# Patient Record
Sex: FEMALE | Race: BLACK OR AFRICAN AMERICAN
[De-identification: names, ages, dates, MRNs, and addresses within clinical notes are randomized per-mention and may not be internally consistent; named-entity substitution may affect disease eponyms.]

---

## 2019-06-03 ENCOUNTER — HOSPITAL ENCOUNTER (INPATIENT)
Dept: HOSPITAL 74 - YASAS | Age: 54
LOS: 4 days | Discharge: OTHER INSTITUTION | DRG: 773 | End: 2019-06-07
Attending: SURGERY | Admitting: SURGERY
Payer: COMMERCIAL

## 2019-06-03 VITALS — BODY MASS INDEX: 24.9 KG/M2

## 2019-06-03 DIAGNOSIS — F11.20: ICD-10-CM

## 2019-06-03 DIAGNOSIS — F10.230: Primary | ICD-10-CM

## 2019-06-03 DIAGNOSIS — H26.9: ICD-10-CM

## 2019-06-03 DIAGNOSIS — F12.20: ICD-10-CM

## 2019-06-03 DIAGNOSIS — F13.230: ICD-10-CM

## 2019-06-03 DIAGNOSIS — R94.31: ICD-10-CM

## 2019-06-03 DIAGNOSIS — Z87.09: ICD-10-CM

## 2019-06-03 DIAGNOSIS — F14.20: ICD-10-CM

## 2019-06-03 PROCEDURE — HZ2ZZZZ DETOXIFICATION SERVICES FOR SUBSTANCE ABUSE TREATMENT: ICD-10-PCS | Performed by: SURGERY

## 2019-06-03 RX ADMIN — Medication SCH MG: at 22:17

## 2019-06-03 RX ADMIN — Medication PRN MG: at 22:17

## 2019-06-03 NOTE — HP
CIWA Score


Nausea/Vomitin-No Nausea/No Vomiting


Muscle Tremors: 4-Moderate,w/Arms Extend


Anxiety: 3


Agitation: 3


Paroxysmal Sweats: 2 (Slight facial mositure)


Orientation: 0-Oriented


Tacttile Disturbances: 0-None


Auditory Disturbances: 0-None


Visual Disturbances: 0-None


Headache: 0-None Present


CIWA-Ar Total Score: 12





- Admission Criteria


[facility]AS Guidelines: Admission for Medically Managed Detox: 


Requires at least one of the followin. CIWA greater than 12


2. Seizures within the past 24 hours


3. Delirium tremens within the past 24 hours


4. Hallucinations within the past 24 hours


5. Acute intervention needed for co  occurring medical disorder


6. Acute intervention needed for co  occurring psychiatric disorder


7. Severe withdrawal that cannot be handled at a lower level of care (continued


    vomiting, continued diarrhea, abnormal vital signs) requiring intravenous


    medication and/or fluids


8. Pregnancy





Patient presents the following: CIWA greater than 12


Admission Criteria Met: Admission criteria met





Admission ROS [facility]


Chief Complaint: 





Alcohol withdrawal.


Allergies/Adverse Reactions: 


 Allergies











Allergy/AdvReac Type Severity Reaction Status Date / Time


 


No Known Drug Allergies Allergy   Verified 19 17:56


 


rice Allergy   Verified 19 13:30











History of Present Illness: 





Alcohol use began at age 13. Current use x 5 years is approx 3 -16 oz vodkas 

daily


Cocaine - 1 bundle started use at about 15 years ago (Smokes and injects)


Marijuana use began at age 15. 


Benzo use began at age 54.  1-2 klonopin 2x/wk 


Heroin use began at age 17.  Relapses with heroin on the weekends. Uses IVDU. 

Does not share needles or works. On [facility]. Current methadone 

dose is 80 mg daily.  Does not have a Narcan kit at home. 





Denies hx seizures or overdoses.


Last blackout 1 month ago. 





PMHx: Asthma (last exacerbation years ago)





MHHx: Depression, insomnia,  and anxiety. Last saw a MH Provider 1 month ago. 

States compliant w/ mental health medications. Denies thoughts of harming self 

or others.

















Patient Name: Priya Erickson YOB: 1965


 


Address: [address] [place] [address] Sex: Female














 Rx Written Rx Dispensed Drug Quantity Days Supply Prescriber Name  


 


2019 zolpidem tartrate 5 mg tablet  30 30 Adepoju, Patricia 

Tiana  


 


2019 zolpidem tartrate 10 mg tablet  30 30 Adepoju, Patricia 

Tiana  


 


2019 zolpidem tartrate 10 mg tablet  30 30 Adepoju, Patricia 

Tiana  


 


2019 zolpidem tartrate 10 mg tablet  30 30 Adepoju, Patricia 

Tiana  


 


12/10/2018 2018 zolpidem tartrate 10 mg tablet  30 30 Adepoju, Patricia 

Tiana  


 


10/30/2018 2018 zolpidem tartrate 10 mg tablet  30 30 Adepoju, Patricia 

Tiana  


 


10/02/2018 10/08/2018 zolpidem tartrate 10 mg tablet  30 30 Adepoju, Patricia 

Tiana  


 


2018 zolpidem tartrate 10 mg tablet  30 30 Adepoju, Patricia 

Tiana  


 


2018 zolpidem tartrate 10 mg tablet  30 30 Adepoju, Patricia 

Tiana  


 


2018 07/10/2018 zolpidem tartrate 10 mg tablet  30 30 Adepoju, Patricia 

Tiana  


 


2018 zolpidem tartrate 10 mg tablet  30 30 Adepoju, Patricia Montiel  








Search Terms: Priya Dre, 1965 


Search Date: 2019 05:05:57 PM 


States Searched: [place], [place], [place], [place], [place], [place], [place], [place] 


The Drug Utilization Report below displays the controlled substance 

prescriptions, if any, that were dispensed in the indicated state(s). The 

information displayed on this report is compiled from requests submitted to 

other states' PMPs, and accurately reflects the information as returned by 

them. Blank fields indicate data not provided by other state.


This report was requested by: Olga Yu | Reference #: [id number] 


Exam Limitations: No Limitations





- Ebola screening


Have you traveled outside of the country in the last 21 days: No


Have you had contact with anyone from an Ebola affected area: No


Have you been sick,other than usual withdrawal symptoms: No (Denies recent 

exposure to measles)


Do you have a fever: No





- Review of Systems


Constitutional: Chills, Diaphoresis, Changes in sleep (Difficulty staying asleep

)


EENT: reports: Cataracts ((R) eye -  no eye drops), Dental Problems (Dentures. 

Able to chew and swallow ok)


Respiratory: reports: No Symptoms reported


Cardiac: reports: No Symptoms Reported


GI: reports: No Symptoms Reported


: reports: No Symptoms Reported


Musculoskeletal: reports: No Symptoms Reported


Neuro: reports: No Symptoms reported


Endocrine: reports: No Symptoms Reported


Hematology: reports: Anemia (Low iron)


Psychiatric: reports: Judgement Intact, Agitated, Anxious, Depressed (Denies 

thoughts of harming self or others.)


Other Systems: Reviewed and Negative





Patient History





- PPD History


Previous Implant?: Yes


Documented Results: Negative w/o proof


Implanted On Prior R Admission?: No


PPD to be Administered?: Yes





- Reproductive History


Patient is a Female of Child Bearing Age (11 -55 yrs old): Yes


Patient Pregnant: No (LMP )





- Smoking Cessation


Smoking history: Current every day smoker


Have you smoked in the past 12 months: Yes


Aproximately how many cigarettes per day: 15


Hx Chewing Tobacco Use: No


Initiated information on smoking cessation: Yes


'Breaking Loose' booklet given: 19





- Substance & Tx. History


Hx Alcohol Use: Yes


Hx Substance Use: Yes


Substance Use Type: Alcohol, Cocaine, Marijuana, Tranquilizers





- Substances abused


  ** Alcohol


Substance route: Oral


Frequency: Daily


Amount used: 1 PINT VODKA


Age of first use: 13


Date of last use: 19





  ** Other


Other (specify): METHADONE


Substance route: Oral


Frequency: Daily (Prescribed)


Amount used: 80MG


Age of first use: 42


Date of last use: 19





Admission Physical Exam [facility]





- Vital Signs


Vital Signs: 


 Vital Signs - 24 hr











  19





  13:30


 


Temperature 98.4 F


 


Pulse Rate 86


 


Respiratory 18





Rate 


 


Blood Pressure 130/72














- Physical


General Appearance: Yes: Nourished, Mild Distress, Tremorous, Sweating (Slight 

facial mositure), Anxious


HEENTM: Yes: EOMI, Hearing grossly Normal, Normocephalic, Pharynx Normal (No 

teeth), Other ((R) pupil whitish/opaque in color. (L) black. Both pupils 

respond to light.)


Respiratory: Yes: Lungs Clear, Normal Breath Sounds, No Respiratory Distress


Neck: Yes: No masses,lesions,Nodules, Supple


Breast: Yes: Breast Exam Deferred


Cardiology: Yes: Regular Rhythm, Regular Rate (HR: 64.), S1, S2, Bradycardia, 

Other (Abnormal EKG (? septal infarct/T wave abnormality) this visit.)


Abdominal: Yes: Non Tender, Flat, Soft, Increased Bowel Sounds


Genitourinary: Yes: Within Normal Limits


Back: Yes: Normal Inspection


Musculoskeletal: Yes: full range of Motion, Gait Steady


Extremities: Yes: Normal Capillary Refill, Normal Range of Motion, Non-Tender, 

Tremors (Tremors of hands when arms elevated)


Neurological: Yes: CNs II-XII NML intact, Fully Oriented, Alert, Motor Strength 

5/5, Normal Mood/Affect


Integumentary: Yes: Normal Color, Warm, Track Marks (Few needle marks. No 

increased erythema or warmth)


Lymphatic: Yes: Within Normal Limits





- Diagnostic


(1) Alcohol dependence with uncomplicated withdrawal


Current Visit: Yes   Status: Acute   





(2) Cannabis dependence, uncomplicated


Current Visit: Yes   Status: Chronic   





(3) Sedative, hypnotic or anxiolytic abuse, uncomplicated


Current Visit: Yes   Status: Acute   





(4) Cocaine dependence, uncomplicated


Current Visit: Yes   Status: Chronic   





(5) Opioid dependence on agonist therapy


Current Visit: Yes   Status: Chronic   Comment: On [facility]. 

Continues to relapse w/ opiates.   





(6) History of asthma


Current Visit: No   Status: Chronic   





(7) Pupil disorder


Current Visit: Yes   Status: Chronic   Comment: Opaque (R) pupil   





(8) Abnormal finding on EKG


Current Visit: Yes   Status: Suspected   Comment: Denies CP/SOB. Will repeat 

EKG in am.    





Cleared for Admission [facility]





- Detox or Rehab


[facility] Level of Care: Medically Managed


Detox Regimen/Protocol: Librium


Claeared for Rehab Admission: No





Inpatient Rehab Admission





- Rehab Decision to Admit


Inpatient rehab admission?: No

## 2019-06-04 LAB
ALBUMIN SERPL-MCNC: 3.2 G/DL (ref 3.4–5)
ALP SERPL-CCNC: 95 U/L (ref 45–117)
ALT SERPL-CCNC: 31 U/L (ref 13–61)
ANION GAP SERPL CALC-SCNC: 2 MMOL/L (ref 8–16)
APPEARANCE UR: (no result)
AST SERPL-CCNC: 29 U/L (ref 15–37)
BACTERIA #/AREA URNS HPF: 360.9 /HPF
BILIRUB SERPL-MCNC: 0.2 MG/DL (ref 0.2–1)
BILIRUB UR STRIP.AUTO-MCNC: NEGATIVE MG/DL
BUN SERPL-MCNC: 13 MG/DL (ref 7–18)
CALCIUM SERPL-MCNC: 9.3 MG/DL (ref 8.5–10.1)
CASTS #/AREA URNS LPF: 15 /LPF (ref 0–8)
CHLORIDE SERPL-SCNC: 108 MMOL/L (ref 98–107)
CO2 SERPL-SCNC: 35 MMOL/L (ref 21–32)
COLOR UR: YELLOW
CREAT SERPL-MCNC: 1 MG/DL (ref 0.55–1.3)
DEPRECATED RDW RBC AUTO: 14.2 % (ref 11.6–15.6)
EPITH CASTS URNS QL MICRO: 8.5 /HPF
GLUCOSE SERPL-MCNC: 71 MG/DL (ref 74–106)
HCT VFR BLD CALC: 42.3 % (ref 32.4–45.2)
HGB BLD-MCNC: 13.5 GM/DL (ref 10.7–15.3)
KETONES UR QL STRIP: (no result)
LEUKOCYTE ESTERASE UR QL STRIP.AUTO: (no result)
MCH RBC QN AUTO: 30.3 PG (ref 25.7–33.7)
MCHC RBC AUTO-ENTMCNC: 32 G/DL (ref 32–36)
MCV RBC: 94.5 FL (ref 80–96)
NITRITE UR QL STRIP: NEGATIVE
PH UR: 6 [PH] (ref 5–8)
PLATELET # BLD AUTO: 154 K/MM3 (ref 134–434)
PMV BLD: 9.8 FL (ref 7.5–11.1)
POTASSIUM SERPLBLD-SCNC: 4 MMOL/L (ref 3.5–5.1)
PROT SERPL-MCNC: 6.5 G/DL (ref 6.4–8.2)
PROT UR QL STRIP: NEGATIVE
PROT UR QL STRIP: NEGATIVE
RBC # BLD AUTO: 3 /HPF (ref 0–4)
RBC # BLD AUTO: 4.48 M/MM3 (ref 3.6–5.2)
SODIUM SERPL-SCNC: 144 MMOL/L (ref 136–145)
SP GR UR: 1.02 (ref 1.01–1.03)
UROBILINOGEN UR STRIP-MCNC: 1 MG/DL (ref 0.2–1)
WBC # BLD AUTO: 5.1 K/MM3 (ref 4–10)
WBC # UR AUTO: 15 /HPF (ref 0–5)

## 2019-06-04 RX ADMIN — Medication SCH MG: at 21:55

## 2019-06-04 RX ADMIN — Medication PRN MG: at 21:55

## 2019-06-04 RX ADMIN — Medication SCH TAB: at 09:31

## 2019-06-04 RX ADMIN — NICOTINE SCH: 21 PATCH TRANSDERMAL at 11:00

## 2019-06-04 NOTE — EKG
Test Reason : 

Blood Pressure : ***/*** mmHG

Vent. Rate : 060 BPM     Atrial Rate : 060 BPM

   P-R Int : 128 ms          QRS Dur : 082 ms

    QT Int : 444 ms       P-R-T Axes : 053 074 060 degrees

   QTc Int : 444 ms

 

NORMAL SINUS RHYTHM

MODERATE VOLTAGE CRITERIA FOR LVH, MAY BE NORMAL VARIANT

CANNOT RULE OUT SEPTAL INFARCT , AGE UNDETERMINED

T WAVE ABNORMALITY, CONSIDER ANTERIOR ISCHEMIA

ABNORMAL ECG

NO PREVIOUS ECGS AVAILABLE

Confirmed by Filemon Burton MD ([id number]) on 6/4/2019 12:04:28 PM

 

Referred By:             Confirmed By:Filemon Burton MD

## 2019-06-04 NOTE — EKG
Test Reason : 

Blood Pressure : ***/*** mmHG

Vent. Rate : 063 BPM     Atrial Rate : 063 BPM

   P-R Int : 136 ms          QRS Dur : 074 ms

    QT Int : 442 ms       P-R-T Axes : 061 058 041 degrees

   QTc Int : 452 ms

 

NORMAL SINUS RHYTHM

NONSPECIFIC T WAVE ABNORMALITY

ABNORMAL ECG

WHEN COMPARED WITH ECG OF 03-JUN-2019 18:54,

NO SIGNIFICANT CHANGE WAS FOUND

Confirmed by Filemon Burton MD ([id number]) on 6/4/2019 12:04:24 PM

 

Referred By: FEDE YOUNG           Confirmed By:Filemon Burton MD

## 2019-06-05 LAB — CAOX CRY #/AREA URNS HPF: (no result) /HPF

## 2019-06-05 RX ADMIN — Medication SCH TAB: at 10:57

## 2019-06-05 RX ADMIN — Medication SCH MG: at 22:37

## 2019-06-05 RX ADMIN — Medication PRN MG: at 22:38

## 2019-06-05 RX ADMIN — NICOTINE SCH: 21 PATCH TRANSDERMAL at 10:57

## 2019-06-05 RX ADMIN — METHADONE HYDROCHLORIDE SCH MG: 40 TABLET ORAL at 05:34

## 2019-06-05 NOTE — PN
[facility] CIWA





- CIWA Score


Nausea/Vomitin-No Nausea/No Vomiting


Muscle Tremors: 3


Anxiety: 2


Agitation: 2


Paroxysmal Sweats: 2


Orientation: 0-Oriented


Tacttile Disturbances: 0-None


Auditory Disturbances: 0-None


Visual Disturbances: 0-None


Headache: 0-None Present


CIWA-Ar Total Score: 9





[facility] Progress Note (SOAP)


Subjective: 





feeling better


little sweats


chills





Objective: 





19 10:29


 Vital Signs











Temperature  97.7 F   19 09:35


 


Pulse Rate  53 L  19 09:35


 


Respiratory Rate  18   19 09:35


 


Blood Pressure  103/70   19 09:35


 


O2 Sat by Pulse Oximetry (%)      








 Laboratory Tests











  19





  18:31 07:00 07:00


 


WBC   5.1 


 


RBC   4.48 


 


Hgb   13.5 


 


Hct   42.3 


 


MCV   94.5 


 


MCH   30.3 


 


MCHC   32.0 


 


RDW   14.2 


 


Plt Count   154 


 


MPV   9.8 


 


Sodium    144


 


Potassium    4.0


 


Chloride    108 H


 


Carbon Dioxide    35 H


 


Anion Gap    2 L


 


BUN    13


 


Creatinine    1.0


 


Est GFR (CKD-EPI)AfAm    73.97


 


Est GFR (CKD-EPI)NonAf    63.82


 


Random Glucose    71 L


 


Calcium    9.3


 


Total Bilirubin    0.2


 


AST    29


 


ALT    31


 


Alkaline Phosphatase    95


 


Total Protein    6.5


 


Albumin    3.2 L


 


Urine Color   


 


Urine Appearance   


 


Urine pH   


 


Ur Specific Gravity   


 


Urine Protein   


 


Urine Glucose (UA)   


 


Urine Ketones   


 


Urine Blood   


 


Urine Nitrite   


 


Urine Bilirubin   


 


Urine Urobilinogen   


 


Ur Leukocyte Esterase   


 


Urine WBC (Auto)   


 


Urine RBC (Auto)   


 


Urine Casts (Auto)   


 


U Epithel Cells (Auto)   


 


U Sm Round Cell (Auto)   


 


Urine Crystals (Auto)   


 


Urine Bacteria (Auto)   


 


POC Urine HCG, Qual  Negative  


 


RPR Titer   














  19





  07:00 11:20


 


WBC  


 


RBC  


 


Hgb  


 


Hct  


 


MCV  


 


MCH  


 


MCHC  


 


RDW  


 


Plt Count  


 


MPV  


 


Sodium  


 


Potassium  


 


Chloride  


 


Carbon Dioxide  


 


Anion Gap  


 


BUN  


 


Creatinine  


 


Est GFR (CKD-EPI)AfAm  


 


Est GFR (CKD-EPI)NonAf  


 


Random Glucose  


 


Calcium  


 


Total Bilirubin  


 


AST  


 


ALT  


 


Alkaline Phosphatase  


 


Total Protein  


 


Albumin  


 


Urine Color   Yellow


 


Urine Appearance   Cloudy


 


Urine pH   6.0


 


Ur Specific Gravity   1.024


 


Urine Protein   Negative


 


Urine Glucose (UA)   Negative


 


Urine Ketones   Trace H


 


Urine Blood   Negative


 


Urine Nitrite   Negative


 


Urine Bilirubin   Negative


 


Urine Urobilinogen   1.0


 


Ur Leukocyte Esterase   2+ H


 


Urine WBC (Auto)   15


 


Urine RBC (Auto)   3


 


Urine Casts (Auto)   15


 


U Epithel Cells (Auto)   8.5


 


U Sm Round Cell (Auto)   1+


 


Urine Crystals (Auto)   1+


 


Urine Bacteria (Auto)   360.9


 


POC Urine HCG, Qual  


 


RPR Titer  Nonreactive 








labs noted


repeat u/a


aaox3


ambulating


no acute distress


Assessment: 





19 10:55


mild withdrawal sx


Plan: 





continue detox


increase fluids


repeat u/a

## 2019-06-06 LAB
APPEARANCE UR: (no result)
BACTERIA #/AREA URNS HPF: 2961 /HPF
BILIRUB UR STRIP.AUTO-MCNC: NEGATIVE MG/DL
CASTS #/AREA URNS LPF: 38 /LPF (ref 0–8)
COLOR UR: YELLOW
EPITH CASTS URNS QL MICRO: >36 /HPF
KETONES UR QL STRIP: NEGATIVE
LEUKOCYTE ESTERASE UR QL STRIP.AUTO: (no result)
NITRITE UR QL STRIP: NEGATIVE
PH UR: 6.5 [PH] (ref 5–8)
PROT UR QL STRIP: NEGATIVE
PROT UR QL STRIP: NEGATIVE
RBC # BLD AUTO: 3.6 /HPF (ref 0–4)
SP GR UR: 1.02 (ref 1.01–1.03)
UROBILINOGEN UR STRIP-MCNC: 1 MG/DL (ref 0.2–1)
WBC # UR AUTO: 128 /HPF (ref 0–5)

## 2019-06-06 RX ADMIN — Medication SCH TAB: at 10:04

## 2019-06-06 RX ADMIN — NICOTINE SCH: 21 PATCH TRANSDERMAL at 10:04

## 2019-06-06 RX ADMIN — Medication SCH MG: at 22:38

## 2019-06-06 RX ADMIN — METHADONE HYDROCHLORIDE SCH MG: 40 TABLET ORAL at 05:36

## 2019-06-06 RX ADMIN — Medication PRN MG: at 22:38

## 2019-06-06 NOTE — PN
S CIWA





- CIWA Score


Nausea/Vomitin-No Nausea/No Vomiting


Muscle Tremors: 1-None Visible, but Felt


Anxiety: 1-Mildly Anxious


Agitation: 1-Slight > Activity


Paroxysmal Sweats: No Perspiration


Orientation: 0-Oriented


Tacttile Disturbances: 0-None


Auditory Disturbances: 0-None


Visual Disturbances: 0-None


Headache: 0-None Present


CIWA-Ar Total Score: 3





[facility] Progress Note (SOAP)


Subjective: 





feeling much better


little anxiety


Objective: 





19 11:17


 Vital Signs











Temperature  97.6 F   19 09:30


 


Pulse Rate  54 L  19 09:30


 


Respiratory Rate  18   19 09:30


 


Blood Pressure  104/67   19 09:30


 


O2 Sat by Pulse Oximetry (%)      








aaox3


ambulating


no acute distress





Assessment: 





19 11:17


withdrawal sx


Plan: 





continue detox


increase fluids


d/c in am

## 2019-06-07 ENCOUNTER — HOSPITAL ENCOUNTER (INPATIENT)
Dept: HOSPITAL 74 - YASAS | Age: 54
LOS: 13 days | Discharge: HOME | End: 2019-06-20
Payer: COMMERCIAL

## 2019-06-07 VITALS — DIASTOLIC BLOOD PRESSURE: 54 MMHG | TEMPERATURE: 97.6 F | SYSTOLIC BLOOD PRESSURE: 103 MMHG | HEART RATE: 67 BPM

## 2019-06-07 RX ADMIN — Medication SCH TAB: at 10:03

## 2019-06-07 RX ADMIN — NICOTINE SCH: 21 PATCH TRANSDERMAL at 10:03

## 2019-06-07 RX ADMIN — METHADONE HYDROCHLORIDE SCH MG: 40 TABLET ORAL at 06:12

## 2019-06-07 NOTE — DS
[facility] Detox Discharge Summary


Admission Date: 


06/03/19





Discharge Date: 06/07/19





- History


Present History: Alcohol Dependence, Cannabis Dependence, Cocaine Dependence, 

Sedative Dependence





- Physical Exam Results


Vital Signs: 


 Vital Signs











Temperature  98.0 F   06/07/19 09:26


 


Pulse Rate  51 L  06/07/19 09:26


 


Respiratory Rate  18   06/07/19 09:26


 


Blood Pressure  106/53 L  06/07/19 09:26


 


O2 Sat by Pulse Oximetry (%)      














- Treatment


Hospital Course: Detox Protocol Followed, Detoxed Safely, Responded well, 

Discharged Condition Good, Rehab Referral Accepted





- Medication


Discharge Medications: 


Ambulatory Orders





Albuterol Sulfate Inhaler - [Ventolin Hfa Inhaler -] 1 - 2 inh PO Q4H 06/03/19 


Methadone [Dolophine -] 80 mg PO DAILY 06/03/19 


Sertraline HCl [Zoloft -] 25 mg PO DAILY 06/03/19 


traZODone HCL [Trazodone HCl] 50 mg PO HS 06/03/19 











- Diagnosis


(1) Alcohol dependence with uncomplicated withdrawal


Current Visit: Yes   Status: Chronic   





(2) Sedative, hypnotic or anxiolytic abuse, uncomplicated


Current Visit: Yes   Status: Chronic   





(3) Cannabis dependence, uncomplicated


Current Visit: Yes   Status: Chronic   





(4) Cocaine dependence, uncomplicated


Current Visit: Yes   Status: Chronic   





(5) Opioid dependence on agonist therapy


Current Visit: Yes   Status: Chronic   





(6) Abnormal finding on EKG


Current Visit: Yes   Status: Suspected   





(7) History of asthma


Current Visit: Yes   Status: Chronic   





- AMA


Did Patient Leave Against Medical Advice: No (referred to [facility] 

[facility]hab.)

## 2023-12-11 NOTE — PN
Patient called asking if she could speak with Dr. Chilel or Rn regarding her appt today. She stated that she is having transportation issues and she would like to discuss other options and concerns further with  or Rn. Patient can be reached at [phone] for follow up    [facility] CIWA





- CIWA Score


Nausea/Vomitin-No Nausea/No Vomiting


Muscle Tremors: 3


Anxiety: 3


Agitation: 3


Paroxysmal Sweats: 2


Orientation: 0-Oriented


Tacttile Disturbances: 0-None


Auditory Disturbances: 0-None


Visual Disturbances: 0-None


Headache: 0-None Present


CIWA-Ar Total Score: 11





[facility] Progress Note (SOAP)


Subjective: 





irritable


agitation


body aches


interrupted sleep





Objective: 





19 10:48


 Vital Signs











Temperature  97.5 F L  19 09:36


 


Pulse Rate  58 L  19 09:36


 


Respiratory Rate  17   19 09:36


 


Blood Pressure  139/68   19 09:36


 


O2 Sat by Pulse Oximetry (%)      








 Laboratory Tests











  19





  18:31 07:00 07:00


 


WBC   5.1 


 


RBC   4.48 


 


Hgb   13.5 


 


Hct   42.3 


 


MCV   94.5 


 


MCH   30.3 


 


MCHC   32.0 


 


RDW   14.2 


 


Plt Count   154 


 


MPV   9.8 


 


Sodium    144


 


Potassium    4.0


 


Chloride    108 H


 


Carbon Dioxide    35 H


 


Anion Gap    2 L


 


BUN    13


 


Creatinine    1.0


 


Est GFR (CKD-EPI)AfAm    73.97


 


Est GFR (CKD-EPI)NonAf    63.82


 


Random Glucose    71 L


 


Calcium    9.3


 


Total Bilirubin    0.2


 


AST    29


 


ALT    31


 


Alkaline Phosphatase    95


 


Total Protein    6.5


 


Albumin    3.2 L


 


POC Urine HCG, Qual  Negative  








labs noted


aaox3


ambulating


no acute distress


Assessment: 





19 10:48


mild withdrawal sx


Plan: 





continue detox


increase fluids